# Patient Record
Sex: FEMALE | Race: BLACK OR AFRICAN AMERICAN | Employment: FULL TIME | URBAN - METROPOLITAN AREA
[De-identification: names, ages, dates, MRNs, and addresses within clinical notes are randomized per-mention and may not be internally consistent; named-entity substitution may affect disease eponyms.]

---

## 2019-08-06 ENCOUNTER — HOSPITAL ENCOUNTER (OUTPATIENT)
Dept: LAB | Age: 49
Discharge: HOME | End: 2019-08-06
Payer: COMMERCIAL

## 2019-08-06 LAB
BASOPHILS # BLD: 0 K/UL (ref 0–0.2)
BASOPHILS NFR BLD: 0 % (ref 0–2)
CRP SERPL-MCNC: <0.3 MG/DL (ref 0–0.9)
DIFFERENTIAL METHOD BLD: NORMAL
EOSINOPHIL # BLD: 0.1 K/UL (ref 0–0.8)
EOSINOPHIL NFR BLD: 2 % (ref 0.5–7.8)
ERYTHROCYTE [DISTWIDTH] IN BLOOD BY AUTOMATED COUNT: 12 % (ref 11.9–14.6)
ERYTHROCYTE [SEDIMENTATION RATE] IN BLOOD: 20 MM/HR (ref 0–30)
HCT VFR BLD AUTO: 38 % (ref 35.8–46.3)
HGB BLD-MCNC: 12.8 G/DL (ref 11.7–15.4)
IMM GRANULOCYTES # BLD AUTO: 0 K/UL (ref 0–0.5)
IMM GRANULOCYTES NFR BLD AUTO: 0 % (ref 0–5)
LYMPHOCYTES # BLD: 2.1 K/UL (ref 0.5–4.6)
LYMPHOCYTES NFR BLD: 31 % (ref 13–44)
MCH RBC QN AUTO: 31.1 PG (ref 26.1–32.9)
MCHC RBC AUTO-ENTMCNC: 33.7 G/DL (ref 31.4–35)
MCV RBC AUTO: 92.5 FL (ref 79.6–97.8)
MONOCYTES # BLD: 0.5 K/UL (ref 0.1–1.3)
MONOCYTES NFR BLD: 8 % (ref 4–12)
NEUTS SEG # BLD: 4 K/UL (ref 1.7–8.2)
NEUTS SEG NFR BLD: 59 % (ref 43–78)
NRBC # BLD: 0 K/UL (ref 0–0.2)
PLATELET # BLD AUTO: 193 K/UL (ref 150–450)
PMV BLD AUTO: 11.8 FL (ref 9.4–12.3)
RBC # BLD AUTO: 4.11 M/UL (ref 4.05–5.2)
URATE SERPL-MCNC: 5.3 MG/DL (ref 2.6–6)
WBC # BLD AUTO: 6.9 K/UL (ref 4.3–11.1)

## 2019-08-06 PROCEDURE — 81374 HLA I TYPING 1 ANTIGEN LR: CPT

## 2019-08-06 PROCEDURE — 84550 ASSAY OF BLOOD/URIC ACID: CPT

## 2019-08-06 PROCEDURE — 86200 CCP ANTIBODY: CPT

## 2019-08-06 PROCEDURE — 86140 C-REACTIVE PROTEIN: CPT

## 2019-08-06 PROCEDURE — 86038 ANTINUCLEAR ANTIBODIES: CPT

## 2019-08-06 PROCEDURE — 86431 RHEUMATOID FACTOR QUANT: CPT

## 2019-08-06 PROCEDURE — 85025 COMPLETE CBC W/AUTO DIFF WBC: CPT

## 2019-08-06 PROCEDURE — 85652 RBC SED RATE AUTOMATED: CPT

## 2019-08-06 PROCEDURE — 86430 RHEUMATOID FACTOR TEST QUAL: CPT

## 2019-08-06 PROCEDURE — 36415 COLL VENOUS BLD VENIPUNCTURE: CPT

## 2019-08-07 LAB
RHEUMATOID FACT SER QL LA: POSITIVE
RHEUMATOID FACT TITR SER LA: NORMAL {TITER}

## 2019-08-08 LAB
ANA SER QL: NEGATIVE
CCP IGA+IGG SERPL IA-ACNC: 7 UNITS (ref 0–19)

## 2019-08-12 LAB — HLA-B27 QL NAA+PROBE: NEGATIVE

## 2019-09-19 RX ORDER — SODIUM CHLORIDE 0.9 % (FLUSH) 0.9 %
5-40 SYRINGE (ML) INJECTION EVERY 8 HOURS
Status: CANCELLED | OUTPATIENT
Start: 2019-09-19

## 2019-09-19 RX ORDER — SODIUM CHLORIDE 0.9 % (FLUSH) 0.9 %
5-40 SYRINGE (ML) INJECTION AS NEEDED
Status: CANCELLED | OUTPATIENT
Start: 2019-09-19

## 2019-09-22 ENCOUNTER — ANESTHESIA EVENT (OUTPATIENT)
Dept: SURGERY | Age: 49
End: 2019-09-22
Payer: COMMERCIAL

## 2019-09-22 NOTE — DISCHARGE INSTRUCTIONS
Postoperative  Instructions:      Weightbearing or Lifting:  Limit  weight  lifting  to  less  than  2  pounds  (coffee  mug)  for  the  first  2  weeks  after  surgery. Dressing  instructions:    Keep  your  dressing  and/or  splint  clean  and  dry  at  all  times. You  can  remove  your  dressing  on  post-operative  day  #5  and  change  with  a  dry/sterile  dressing  or  Band-Aids  as  needed  thereafter. Showering  Instructions:  May  shower  But keep surgical dressing clean and dry until removed as explained above. After dressing is removed, you may allows soapy water to run through the incision during showers but do not scrub. After each shower, pat dry and apply a dry dressing. Do  not  soak  your  Incision in still water or bathtub  for  3  weeks  after  surgery. If  the  incision  gets  wet otherwise,  pat  dry  and  do  not  scrub  the  incision. Do  not  apply  cream  or  lotion  to  incision      Pain  Control:  - You  have  been  given  a  prescription  to  be  taken  as  directed  for  post-operative  pain  control. In  addition,  elevate  the  operative  extremity  above  the  heart  at  all  times  to  prevent  swelling  and  throbbing  pain. - If you develop constipation while taking narcotic pain medications (Norco, Hydrocodone, Percocet, Oxycodone, Dilaudid, Hydromorphone) take  over-the-counter  Colace,  100mg  by  mouth  twice  a  Day. - Nausea  is  a  common  side  effect  of  many  pain  medications. You  will  want  to  eat something  before  taking  your  pain  medicine  to  help  prevent  Nausea. - If  you  are  taking  a  prescription  pain  medication  that  contains  acetaminophen,  we  recommend  that  you  do  not  take  additional  over  the  counter  acetaminophen  (Tylenol®).       Other  pain  relieving  options:   - Using  a  cold  pack  to  ice  the  affected  area  a  few  times  a  day  (15  to  20  minutes  at  a  time)  can  help  to  relieve pain,  reduce  swelling  and  bruising.      - Elevation  of  the  affected  area  can  also  help  to  reduce  pain  and  swelling. Did  you  receive  a  nerve  Block? A  nerve  block  can  provide  pain  relief  for  one  hour  to  two  days  after  your  surgery. As  long  as  the  nerve  block  is  working,  you  will  experience  little  or  no  sensation  in  the  area  the  surgeon  operated  on. As  the  nerve  block  wears  off,  you  will  begin  to  experience  pain  or  discomfort. It  is  very  important  that  you  begin  taking  your  prescribed  pain  medication  before  the  nerve  block  fully  wears  off. The first sign that the nerve block is wearing off is tingling in your fingers. Treating  your  pain  at  the  first  sign  of  the  block  wearing  off  will  ensure  your  pain  is  better  controlled  and  more  tolerable  when  full-sensation  returns. Do  not  wait  until  the  pain  is  intolerable,  as  the  medicine  will  be  less  effective. It  is  better  to  treat  pain  in  advance  than  to  try  and  catch  up. General  Anesthesia or Sedation:      If  you  did  not  receive  a  nerve  block  during  your  surgery,  you  will  need  to  start  taking  your  pain  medication  shortly  after  your  surgery  and  should  continue  to  do  so  as  prescribed  by  your  surgeon. Please  call  [phone]  with any concern and ask to speak with Antonio Baez. Concerning problems include:      -  Excessive  redness  of  the  incisions      -  Drainage  for  more  than  2  Days after surgery or any foul smelling drainage  -  Fever  of  more  than  101.5  F      Please  call  [phone]  if  you  do  not  receive  or  are  unsure  of  your  first  follow-up  appointment. You  should  see  the  doctor  10-14  days  after  your  Surgery. Thank you for choosing me and [address] [place] [address] for your care.  I will go above and beyond to ensure you receive the best care possible.     Tommy Hidalgo MD, PhD

## 2019-09-22 NOTE — H&P
Darryle Sear  1970  female    Follow up:  right tennis elbow, right carpal tunnel syndrome    HPI: Patient is a 44year old female who is following up for right tennis elbow, right carpal tunnel syndrome. Last visit we provided referral to rheumatology because we got a positive RA lab. She was seen by rheumatology and started on Mobic because her titers were low, they will recheck the titers in 2 months and discuss further management if titers are higher. The current symptoms are no better on the elbow. ?????. Past Medical and Surgical, Family and Social History: This has been reviewed and documented on the patient intake form. See scanned documents for further information. Medications: ????? Allergies: NKDA  All medical history, medications and allergies have been discussed with the patient today. ROS:  This has been reviewed and documented on the patient intake form. See scanned documents for further information. Patient reports no change in past medical & surgical history, medications, allergies, social history, family history and review of systems since last visit    Physical Examination: Patient is a healthy appearing female in no acute distress. Bilateral upper limbs have equal and intact peripheral pulses. Skin does not demonstrate any rashes or lesions. Pain at lateral epicondyle, worse with resisted wrist extension and with pronated liftoff test, no pain at medial epicondyle, negative tinel at the Cubital tunnel. Positive Tinel and phalen at the right wrist for CTS, normal sensation in ulnar and median distribution but she does endorse median distrubution numbness intermittently. Imaging:  MRI reviewed and demonstrates high grade partial tear of the common extensor mass, radiocapitellar plica and some tendinosis of the common flexor mass  ?   Assessment: G56.01 Right carpal tunnel syndrome   M77.11 Lateral epicondylitis, right elbow     Plan: We discussed the diagnosis and different treatment options. We discussed repeated steroid injections and give it more time, patient is frustrated and she wants to have surgery because this has been going on for over 8 months and she has tried steroid injections and therapy. After thorough discussion, the patient elects to proceed with RIGHT LATERAL EPICONDYLE DEBRIDEMENT, EXTENSOR TENDON REPAIR AND RADIOCAPITELLAR JOINT DEBRIDEMENT. Patient voiced accordance and understanding of the information provided and the formulated plan. All questions were answered to the patient's satisfaction during the encounter. ????? Patient understands risks and benefits of RIGHT LATERAL EPICONDYLE DEBRIDEMENT, EXTENSOR TENDON REPAIR AND RADIOCAPITELLAR JOINT DEBRIDEMENT including but not limited to nerve injury, vessel injury, infection, failure to achieve desired results and possible need for additional surgery. Patient understands and wishes to proceed with surgery.     On Exam:   The patient is alert and oriented; ;   Lung auscultation is clear bilaterally   Heart has RRR without murmurs    Jie Drew MD  09/22/19  7:16 AM

## 2019-09-23 ENCOUNTER — ANESTHESIA (OUTPATIENT)
Dept: SURGERY | Age: 49
End: 2019-09-23
Payer: COMMERCIAL

## 2019-09-23 ENCOUNTER — HOSPITAL ENCOUNTER (OUTPATIENT)
Age: 49
Setting detail: OUTPATIENT SURGERY
Discharge: HOME | End: 2019-09-23
Attending: ORTHOPAEDIC SURGERY | Admitting: ORTHOPAEDIC SURGERY
Payer: COMMERCIAL

## 2019-09-23 VITALS
TEMPERATURE: 98 F | DIASTOLIC BLOOD PRESSURE: 99 MMHG | HEART RATE: 96 BPM | BODY MASS INDEX: 35.22 KG/M2 | SYSTOLIC BLOOD PRESSURE: 175 MMHG | WEIGHT: 202 LBS | OXYGEN SATURATION: 98 % | RESPIRATION RATE: 18 BRPM

## 2019-09-23 LAB — POTASSIUM BLD-SCNC: 3.4 MMOL/L (ref 3.5–5.1)

## 2019-09-23 PROCEDURE — 77030040356 HC CORD BPLR FRCP COVD -A: Performed by: ORTHOPAEDIC SURGERY

## 2019-09-23 PROCEDURE — 77030031139 HC SUT VCRL2 J&J -A: Performed by: ORTHOPAEDIC SURGERY

## 2019-09-23 PROCEDURE — C1713 ANCHOR/SCREW BN/BN,TIS/BN: HCPCS | Performed by: ORTHOPAEDIC SURGERY

## 2019-09-23 PROCEDURE — 74011250636 HC RX REV CODE- 250/636

## 2019-09-23 PROCEDURE — 77030018836 HC SOL IRR NACL ICUM -A: Performed by: ORTHOPAEDIC SURGERY

## 2019-09-23 PROCEDURE — 77030003602 HC NDL NRV BLK BBMI -B: Performed by: ANESTHESIOLOGY

## 2019-09-23 PROCEDURE — 74011250636 HC RX REV CODE- 250/636: Performed by: ORTHOPAEDIC SURGERY

## 2019-09-23 PROCEDURE — 77030040116 HC KIT SURG BIT GD Q-FX DISP S&N -C: Performed by: ORTHOPAEDIC SURGERY

## 2019-09-23 PROCEDURE — 77030006586 HC BLD ARTHSC BVR BD -A: Performed by: ORTHOPAEDIC SURGERY

## 2019-09-23 PROCEDURE — 77030000032 HC CUF TRNQT ZIMM -B: Performed by: ORTHOPAEDIC SURGERY

## 2019-09-23 PROCEDURE — 76210000020 HC REC RM PH II FIRST 0.5 HR: Performed by: ORTHOPAEDIC SURGERY

## 2019-09-23 PROCEDURE — 77030002933 HC SUT MCRYL J&J -A: Performed by: ORTHOPAEDIC SURGERY

## 2019-09-23 PROCEDURE — 77030012711 HC WND ARTHRO ABLT S&N -D: Performed by: ORTHOPAEDIC SURGERY

## 2019-09-23 PROCEDURE — 76942 ECHO GUIDE FOR BIOPSY: CPT | Performed by: ORTHOPAEDIC SURGERY

## 2019-09-23 PROCEDURE — 74011000250 HC RX REV CODE- 250

## 2019-09-23 PROCEDURE — 77030039266 HC ADH SKN EXOFIN S2SG -A: Performed by: ORTHOPAEDIC SURGERY

## 2019-09-23 PROCEDURE — 76060000033 HC ANESTHESIA 1 TO 1.5 HR: Performed by: ORTHOPAEDIC SURGERY

## 2019-09-23 PROCEDURE — 76010000161 HC OR TIME 1 TO 1.5 HR INTENSV-TIER 1: Performed by: ORTHOPAEDIC SURGERY

## 2019-09-23 PROCEDURE — 84132 ASSAY OF SERUM POTASSIUM: CPT

## 2019-09-23 PROCEDURE — 77030003690 HC NDL TAPR ASPN -A: Performed by: ORTHOPAEDIC SURGERY

## 2019-09-23 PROCEDURE — 74011250636 HC RX REV CODE- 250/636: Performed by: ANESTHESIOLOGY

## 2019-09-23 PROCEDURE — 76010010054 HC POST OP PAIN BLOCK: Performed by: ORTHOPAEDIC SURGERY

## 2019-09-23 PROCEDURE — 76210000063 HC OR PH I REC FIRST 0.5 HR: Performed by: ORTHOPAEDIC SURGERY

## 2019-09-23 DEVICE — 1.8MM Q-FIX ALL SUTURE ANCHOR
Type: IMPLANTABLE DEVICE | Site: ARM | Status: FUNCTIONAL
Brand: Q-FIX

## 2019-09-23 RX ORDER — LIDOCAINE HYDROCHLORIDE 20 MG/ML
INJECTION, SOLUTION EPIDURAL; INFILTRATION; INTRACAUDAL; PERINEURAL AS NEEDED
Status: DISCONTINUED | OUTPATIENT
Start: 2019-09-23 | End: 2019-09-23 | Stop reason: HOSPADM

## 2019-09-23 RX ORDER — SODIUM CHLORIDE 0.9 % (FLUSH) 0.9 %
5-40 SYRINGE (ML) INJECTION EVERY 8 HOURS
Status: DISCONTINUED | OUTPATIENT
Start: 2019-09-23 | End: 2019-09-23 | Stop reason: HOSPADM

## 2019-09-23 RX ORDER — LABETALOL HYDROCHLORIDE 5 MG/ML
10 INJECTION, SOLUTION INTRAVENOUS ONCE
Status: DISCONTINUED | OUTPATIENT
Start: 2019-09-23 | End: 2019-09-23 | Stop reason: HOSPADM

## 2019-09-23 RX ORDER — OXYCODONE AND ACETAMINOPHEN 5; 325 MG/1; MG/1
1 TABLET ORAL AS NEEDED
Status: DISCONTINUED | OUTPATIENT
Start: 2019-09-23 | End: 2019-09-23 | Stop reason: HOSPADM

## 2019-09-23 RX ORDER — SODIUM CHLORIDE 0.9 % (FLUSH) 0.9 %
5-40 SYRINGE (ML) INJECTION AS NEEDED
Status: DISCONTINUED | OUTPATIENT
Start: 2019-09-23 | End: 2019-09-23 | Stop reason: HOSPADM

## 2019-09-23 RX ORDER — PROPOFOL 10 MG/ML
INJECTION, EMULSION INTRAVENOUS AS NEEDED
Status: DISCONTINUED | OUTPATIENT
Start: 2019-09-23 | End: 2019-09-23 | Stop reason: HOSPADM

## 2019-09-23 RX ORDER — BUPIVACAINE HYDROCHLORIDE 2.5 MG/ML
INJECTION, SOLUTION EPIDURAL; INFILTRATION; INTRACAUDAL
Status: COMPLETED | OUTPATIENT
Start: 2019-09-23 | End: 2019-09-23

## 2019-09-23 RX ORDER — MIDAZOLAM HYDROCHLORIDE 1 MG/ML
2 INJECTION, SOLUTION INTRAMUSCULAR; INTRAVENOUS
Status: COMPLETED | OUTPATIENT
Start: 2019-09-23 | End: 2019-09-23

## 2019-09-23 RX ORDER — HYDROMORPHONE HYDROCHLORIDE 2 MG/ML
0.5 INJECTION, SOLUTION INTRAMUSCULAR; INTRAVENOUS; SUBCUTANEOUS
Status: DISCONTINUED | OUTPATIENT
Start: 2019-09-23 | End: 2019-09-23 | Stop reason: HOSPADM

## 2019-09-23 RX ORDER — SODIUM CHLORIDE, SODIUM LACTATE, POTASSIUM CHLORIDE, CALCIUM CHLORIDE 600; 310; 30; 20 MG/100ML; MG/100ML; MG/100ML; MG/100ML
75 INJECTION, SOLUTION INTRAVENOUS CONTINUOUS
Status: DISCONTINUED | OUTPATIENT
Start: 2019-09-23 | End: 2019-09-23 | Stop reason: HOSPADM

## 2019-09-23 RX ORDER — CEFAZOLIN SODIUM/WATER 2 G/20 ML
2 SYRINGE (ML) INTRAVENOUS ONCE
Status: COMPLETED | OUTPATIENT
Start: 2019-09-23 | End: 2019-09-23

## 2019-09-23 RX ORDER — LABETALOL HYDROCHLORIDE 5 MG/ML
INJECTION, SOLUTION INTRAVENOUS
Status: COMPLETED
Start: 2019-09-23 | End: 2019-09-23

## 2019-09-23 RX ORDER — LIDOCAINE HYDROCHLORIDE 10 MG/ML
0.1 INJECTION INFILTRATION; PERINEURAL AS NEEDED
Status: DISCONTINUED | OUTPATIENT
Start: 2019-09-23 | End: 2019-09-23 | Stop reason: HOSPADM

## 2019-09-23 RX ORDER — PROPOFOL 10 MG/ML
INJECTION, EMULSION INTRAVENOUS
Status: DISCONTINUED | OUTPATIENT
Start: 2019-09-23 | End: 2019-09-23 | Stop reason: HOSPADM

## 2019-09-23 RX ORDER — NALOXONE HYDROCHLORIDE 0.4 MG/ML
0.2 INJECTION, SOLUTION INTRAMUSCULAR; INTRAVENOUS; SUBCUTANEOUS AS NEEDED
Status: DISCONTINUED | OUTPATIENT
Start: 2019-09-23 | End: 2019-09-23 | Stop reason: HOSPADM

## 2019-09-23 RX ORDER — BUPIVACAINE HYDROCHLORIDE AND EPINEPHRINE 5; 5 MG/ML; UG/ML
INJECTION, SOLUTION EPIDURAL; INTRACAUDAL; PERINEURAL
Status: COMPLETED | OUTPATIENT
Start: 2019-09-23 | End: 2019-09-23

## 2019-09-23 RX ADMIN — LABETALOL HYDROCHLORIDE: 5 INJECTION INTRAVENOUS at 17:30

## 2019-09-23 RX ADMIN — LIDOCAINE HYDROCHLORIDE 40 MG: 20 INJECTION, SOLUTION EPIDURAL; INFILTRATION; INTRACAUDAL; PERINEURAL at 15:44

## 2019-09-23 RX ADMIN — BUPIVACAINE HYDROCHLORIDE 10 ML: 2.5 INJECTION, SOLUTION EPIDURAL; INFILTRATION; INTRACAUDAL at 13:59

## 2019-09-23 RX ADMIN — BUPIVACAINE HYDROCHLORIDE AND EPINEPHRINE 10 ML: 5; 5 INJECTION, SOLUTION EPIDURAL; INTRACAUDAL; PERINEURAL at 13:59

## 2019-09-23 RX ADMIN — MIDAZOLAM HYDROCHLORIDE 2 MG: 2 INJECTION, SOLUTION INTRAMUSCULAR; INTRAVENOUS at 13:54

## 2019-09-23 RX ADMIN — PROPOFOL 30 MG: 10 INJECTION, EMULSION INTRAVENOUS at 15:44

## 2019-09-23 RX ADMIN — SODIUM CHLORIDE, SODIUM LACTATE, POTASSIUM CHLORIDE, AND CALCIUM CHLORIDE: 600; 310; 30; 20 INJECTION, SOLUTION INTRAVENOUS at 16:45

## 2019-09-23 RX ADMIN — SODIUM CHLORIDE, SODIUM LACTATE, POTASSIUM CHLORIDE, AND CALCIUM CHLORIDE 75 ML/HR: 600; 310; 30; 20 INJECTION, SOLUTION INTRAVENOUS at 12:17

## 2019-09-23 RX ADMIN — Medication 2 G: at 15:41

## 2019-09-23 RX ADMIN — PROPOFOL 140 MCG/KG/MIN: 10 INJECTION, EMULSION INTRAVENOUS at 15:44

## 2019-09-23 NOTE — ANESTHESIA PROCEDURE NOTES
Right Supraclavicular Block    Start time: 9/23/2019 1:55 PM  End time: 9/23/2019 1:59 PM  Performed by: Tamara Garcia MD  Authorized by: Tamara Garcia MD       Pre-procedure: Indications: at surgeon's request and post-op pain management    Preanesthetic Checklist: patient identified, risks and benefits discussed, site marked, timeout performed, anesthesia consent given and patient being monitored    Timeout Time: 13:55          Block Type:   Block Type:  Supraclavicular  Laterality:  Right  Monitoring:  Standard ASA monitoring, responsive to questions, oxygen, continuous pulse ox, frequent vital sign checks and heart rate  Injection Technique:  Single shot  Procedures: ultrasound guided    Patient Position: seated  Prep: chlorhexidine    Location:  Supraclavicular  Needle Type:  Stimuplex  Needle Gauge:  22 G  Needle Localization:  Ultrasound guidance  Motor Response: minimal motor response >0.4 mA      Assessment:  Number of attempts:  1  Injection Assessment:  Incremental injection every 5 mL, negative aspiration for CSF, no paresthesia, ultrasound image on chart, no intravascular symptoms, negative aspiration for blood and local visualized surrounding nerve on ultrasound  Patient tolerance:  Patient tolerated the procedure well with no immediate complications  The relevant block area was scanned before, during, and after the local anesthetic injection and no gross abnormalities were observed.

## 2019-09-23 NOTE — ANESTHESIA PREPROCEDURE EVALUATION
Relevant Problems   No relevant active problems       Anesthetic History   No history of anesthetic complications            Review of Systems / Medical History  Patient summary reviewed, nursing notes reviewed and pertinent labs reviewed    Pulmonary  Within defined limits                 Neuro/Psych         Headaches     Cardiovascular    Hypertension: well controlled          Hyperlipidemia    Exercise tolerance: >4 METS     GI/Hepatic/Renal                Endo/Other    Diabetes: type 2         Other Findings   Comments: On Januvia and Vyctoza           Physical Exam    Airway  Mallampati: II  TM Distance: 4 - 6 cm  Neck ROM: normal range of motion   Mouth opening: Normal     Cardiovascular    Rhythm: regular           Dental  No notable dental hx       Pulmonary  Breath sounds clear to auscultation               Abdominal  GI exam deferred       Other Findings            Anesthetic Plan    ASA: 3  Anesthesia type: total IV anesthesia      Post-op pain plan if not by surgeon: peripheral nerve block single      Anesthetic plan and risks discussed with: Patient and Spouse

## 2019-09-23 NOTE — ANESTHESIA POSTPROCEDURE EVALUATION
Procedure(s):  RIGHT HAND CARPAL TUNNEL RELEASE  RIGHT LATERAL EPICONDYLE DEBRIDEMENT AND TENDON REPAIR.    total IV anesthesia    Anesthesia Post Evaluation      Multimodal analgesia: multimodal analgesia used between 6 hours prior to anesthesia start to PACU discharge  Patient location during evaluation: PACU  Patient participation: complete - patient participated  Level of consciousness: awake and alert  Pain management: adequate  Airway patency: patent  Anesthetic complications: no  Cardiovascular status: acceptable  Respiratory status: acceptable  Hydration status: acceptable  Post anesthesia nausea and vomiting:  none      Vitals Value Taken Time   /110 9/23/2019  5:30 PM   Temp 36.6 °C (97.9 °F) 9/23/2019  4:55 PM   Pulse 97 9/23/2019  5:30 PM   Resp 17 9/23/2019  5:30 PM   SpO2 93 % 9/23/2019  5:30 PM

## 2019-09-23 NOTE — PERIOP NOTES
Instructions to  . Arm dressing is dry and intact. No distress. bp is 175/99 upon discharge, ok with dr. Es Arguelles.

## 2019-09-23 NOTE — H&P
H&P Update:  Juhi Milesnne Lung was seen and examined. History and physical has been reviewed. The patient has been examined.  There have been no significant clinical changes since the completion of the originally dated History and Romina Turner MD, PhD  Orthopaedic Surgery  09/23/19  3:11 PM

## 2019-09-24 NOTE — OP NOTES
ORTHOPAEDIC SURGICAL NOTE        [address] [facility] female 44 y.o.  [id number]   9/23/2019     PRE-OP DIAGNOSIS: Carpal tunnel syndrome on right [G56.01]  Lateral epicondylitis of right elbow [M77.11]   POST-OP DIAGNOSIS: Carpal tunnel syndrome on right [G56.01]  Lateral epicondylitis of right elbow [M77.11]   LATERALITY: Right  Right     PROCEDURES PERFORMED:   Right carpal tunnel release, right lateral epicondyle debridement with extensor tendon reattachment and radiofrequency debridement, right elbow arthrotomy, synovectomy and plica excision (51730, 51802, 30349)       SURGEON:   Bull Graf MD, PhD     IMPLANTS:   Implant Name Type Inv. Item Serial No.  Lot No. LRB No. Used Action   ANCHOR SUT Q-FIX ALL 1.8MM -- Q-FIX - [id number]  ANCHOR SUT Q-FIX ALL 1.8MM -- Q-FIX  [employer] [id number] Right 1 Implanted      Procedure(s):  RIGHT HAND CARPAL TUNNEL RELEASE  RIGHT LATERAL EPICONDYLE DEBRIDEMENT AND TENDON REPAIR   Surgeon(s):  Barbara Carcamo MD   Procedure(s):  RIGHT HAND CARPAL TUNNEL RELEASE  RIGHT LATERAL EPICONDYLE DEBRIDEMENT AND TENDON REPAIR     ANESTHESIA: Regional     STAFF:    Circ-1: Arnie Lombardi RN  Scrub Tech-1: Addy Galvez  Scrub Tech-Relief: Gab Webb     ESTIMATED BLOOD LOSS: Minimal       TOTAL IV FLUIDS : See anesthesia note    COMPLICATIONS: None     DRAINS:       TOURNIQUET TIME:   Total Tourniquet Time Documented:  Upper Arm (Right) - 53 minutes  Total: Upper Arm (Right) - 53 minutes       INDICATION FOR PROCEDURE:     Shannan Mcgill has longstanding right lateral epicondylitis and right carpal tunnel syndrome. Recent MRI demonstrated changes consistent with lateral epicondylitis as well as radiocapitellar plica. Surgical and non-surgical treatment options were discussed with the patient and their family, as well as the risk and benefits of each option.  After thorough discussion, the patient decided to proceed with surgical management. Specific to this treatment plan, we discussed in detail surgical risks including scar, pain, bleeding, infection, anesthetic risks, neurovascular injury, failure to achieve desired results, hardware problems, need for further surgery,  weakness, stiffness, risk of death and potential risk of other unforseen complication. The patient consented to the procedure after discussion of the risks and benefits. DESCRIPTION OF PROCEDURE:     The patient was identified in the holding room. The Right  Right wrist and elbow was marked and confirmed as the correct operative site. They were then brought to the OR and general anesthesia was induced. They were transferred onto the OR table in the supine position. All bony prominences were well padded. SCDs were placed on the bilateral legs throughout the case. A timeout was performed, verifying the correct patient, the correct side being the Right  Right side and the correct procedure. Antibiotics were then administered, and were redosed during the procedure as needed at indicated intervals. A non-sterile tourniquet was placed on the Right  Right arm. The Right  Right upper extremity was pre scrubbed and then prepped and draped in routine sterile fashion. An incisional timeout was performed re-confirming the correct patient, surgical site and procedure, as well as verifying antibiotics. The carpal tunnel incision was made in the palm in line with the radial border of the ring finger under loupe magnification and palmar fascia was incised longitudinally. Blunt retraction used to identify the transverse carpal ligament, which was incised, visualizing the median nerve beneath, which was protected with a slotted freer. The remaining ligament was released with a [place] meniscal blade under direct visualization. The ligament was released in its entirety, and visualized at its most proximal and distal extent.       Wound was irrigated and closed with 5-0 monocryl, glue and sterile dressing applied. A lateral approach was used, the incision was made over the lateral epicondyle and extended distally about 3cms, bovie was used for dissection down to the lateal muscles. The interval between [facility] and ECRL was incised with a knife and the deeper ECRB was identified, there was significant mucous degeneration of the tendon and this was sharply excised, the lateral epicondyle was debrided with sharp knife and the bone was roughed up with a rongeur down to bleeding bed. The radiocapitellar joint was then incised, an arthrotomy was done while staying posterior to the mid radial head and keeping the forearm pronated to avoid injury to the PIN. There was some synovitis which was sharply excised but this was not very significant. There was a prominent radiocapitellar plica which was also sharply excised. The joint was irrigated and the capsule closed with interrupted 0 vicryl sutures. A Smith and Nephew 1.8mm suture anchors was applied to the lateral epicondyle and this was used to repair the tendons back to the epicondyle. The interval was then closed with a runnig locking 0 Vicryl until a tight seal was obtained. The Topaz wand was then used by creating a grid of perforations of 20 in total.     The tourniquet was deflated and hemostasis was ensured. The wounds were then thoroughly irrigated and closed in layered fashion with 3-0 Vicryl and 3-0 monocryl. A sterile dressing was applied followed by a  compressive dressing     The patient was awakened and taken to PACU in stable condition. All sponge and needle counts were correct at the end of the case. I was present and scrubbed for the entire procedure. DISPOSITION:    The patient will be discharged home.      Weightbearing status: NWB DARLINE       CHEMICAL VTE (DVT) PROPHYLAXIS: None warranted for this case     FOLLOW-UP:  10-14 days for suture removal.     Kike Aldridge MD    09/23/19  10:24 PM

## 2019-12-18 PROBLEM — E66.01 SEVERE OBESITY (HCC): Status: ACTIVE | Noted: 2019-12-18

## 2019-12-19 ENCOUNTER — HOSPITAL ENCOUNTER (OUTPATIENT)
Dept: MAMMOGRAPHY | Age: 49
Discharge: HOME | End: 2019-12-19
Attending: INTERNAL MEDICINE
Payer: COMMERCIAL

## 2019-12-19 DIAGNOSIS — Z12.31 SCREENING MAMMOGRAM FOR HIGH-RISK PATIENT: ICD-10-CM

## 2019-12-19 PROCEDURE — 77067 SCR MAMMO BI INCL CAD: CPT

## 2020-07-15 ENCOUNTER — HOSPITAL ENCOUNTER (OUTPATIENT)
Dept: PHYSICAL THERAPY | Age: 50
Discharge: HOME | End: 2020-07-15
Attending: INTERNAL MEDICINE
Payer: COMMERCIAL

## 2020-07-15 DIAGNOSIS — M54.9 MID BACK PAIN: ICD-10-CM

## 2020-07-15 PROCEDURE — 97161 PT EVAL LOW COMPLEX 20 MIN: CPT

## 2020-07-15 PROCEDURE — 97110 THERAPEUTIC EXERCISES: CPT

## 2020-07-15 PROCEDURE — 97140 MANUAL THERAPY 1/> REGIONS: CPT

## 2020-07-15 NOTE — PROGRESS NOTES
Aida Huerta  : 1970  Payor: Lia Rangel / Plan: [id number] [facility] [[email or web address]] / Product Type: PPO /  [address]  [facility], [address]  [phone]   [phone]      OUTPATIENT PHYSICAL THERAPY: Daily Treatment Note 7/15/2020  Visit Count:  1      ICD-10: Treatment Diagnosis:   M54.9 Mid Back Pain  M54.5 Low Back Pain   R26.2 Difficulty in Walking, Not Elsewhere Classified    Precautions/Allergies:   Biaxin [clarithromycin] and Topamax [topiramate]     TREATMENT PLAN:  Effective Dates: 7/15/2020 TO 10/13/2020 (90 days). Frequency/Duration: 2 times a week for 90 Days        PRE-TREATMENT SYMPTOMS/COMPLAINTS:  See eval    MEDICATIONS REVIEWED:  7/15/2020   TREATMENT:   (In addition to Assessment/Re-Assessment sessions the following treatments were rendered)    THERAPEUTIC EXERCISE: (10 minutes):  Exercises per grid below to improve mobility, strength and balance. Required minimal visual and verbal cues to promote proper body alignment and promote proper body posture. Progressed resistance, range and complexity of movement as indicated. Date:  7/15/2020 Date:   Date:     Activity/Exercise Parameters Parameters Parameters   Hooklying TA x15     LTR x30/side     SKTC x15/leg     Supine piriformis stretch [id number]/leg                         MANUAL THERAPY: (20 minutes): Joint mobilization, Soft tissue mobilization and Manipulation was utilized and necessary because of the patient's restricted joint motion, painful spasm, loss of articular motion and restricted motion of soft tissue.   +Sidelying Lumbar Roll mob Grade III/IV to increase mobility Bilat  +Bilat LAD Grade III to improve mobility and distract lumbar spine  +Bilat Piriformis and IR manual stretches       (Used abbreviations: MET - muscle energy technique; PNF - proprioceptive neuromuscular facilitation; NMR - neuromuscular re-education; AP - anterior to posterior; PA - posterior to anterior)    MODALITIES: (0 minutes):      None Today      TREATMENT/SESSION ASSESSMENT:  Lesia Shah verbalized understanding of role of PT and POC. Pt initially very stiff and painful with exercises and mobility. Improved throughout session. Some decrease in subjective carson following. Education: All exercises prescribed as HEP    RECOMMENDATIONS/INTENT FOR NEXT TREATMENT SESSION: \"Next visit will focus on advancements to more challenging activities\".     PAIN: Initial: 5/10 Post Session:  4/10     [employer] Portal    Total Treatment Billable Duration: 30  PT Patient Time In/Time Out  Time In: 2177  Time Out: 3328  Nadine Peacock PT, DPT    Future Appointments   Date Time Provider Dunia Duke   7/22/2020  9:30 AM Marylouise Felty [facility]   7/24/2020  8:45 AM Marylouise Felty [facility]   7/27/2020  8:45 AM Marylouise Felty [facility]   7/29/2020  9:30 AM Marylouise Felty [facility]   9/3/2020  9:20 AM Dwan Phoenix, MD [address] [place] [address]

## 2020-07-15 NOTE — THERAPY EVALUATION
Miriam Graft  : 1970  Payor: Radha Smith / Plan: Latosha Sears OTHER / Product Type: PPO /  [address]  at [facility]  [address], [address] [place] [address]  [phone]   [phone]        OUTPATIENT PHYSICAL THERAPY:Initial Assessment and AM 7/15/2020    ICD-10: Treatment Diagnosis:   M54.9 Mid Back Pain  M54.5 Low Back Pain   R26.2 Difficulty in Walking, Not Elsewhere Classified  Precautions/Allergies:   Biaxin [clarithromycin] and Topamax [topiramate]   Fall Risk Score: 0 (? 5 = High Risk)  MD Orders: Referral to Physical Therapy MEDICAL/REFERRING DIAGNOSIS:  Mid back pain [M54.9]   DATE OF ONSET: ~5 months ago  REFERRING PHYSICIAN: Shelton [address]: Tbd by Patient     ASSESSMENT:  Ms. Coralee Homans has attended 1 physical therapy session including the initial evaluation. Pt presents with Lower thoracic, and lumbar pain. Bilat hip weakness noted and increased gait deviations as listed below. Pt has increased lumbar lordosis and decreased core strength/activation that is causing musculoskeletal imbalances leading to current impairments. Due to current impairment pt is modifying work and home habits. Unable to perform recreational activities. Requires skilled PT. Recommending skilled PT: manual therapeutic techniques (as appropriate), therapeutic exercises and activities, balance interventions, and a comprehensive home exercise program to address the current impairments, as listed above. Miriam Vazquez will benefit from skilled PT (medically necessary) to address above deficits affecting participation in basic ADLs and overall functional tolerance. PROBLEM LIST (Impacting functional limitations):  1. Decreased Strength  2. Decreased ADL/Functional Activities  3. Decreased Transfer Abilities  4. Decreased Ambulation Ability/Technique  5. Decreased Balance  6. Increased Pain  7.  Decreased Flexibility/Joint Mobility  8. Decreased Foley with Home Exercise Program INTERVENTIONS PLANNED:  1. Balance Exercise  2. Bed Mobility  3. Cold  4. Cryotherapy  5. Family Education  6. Gait Training  7. Heat  8. Home Exercise Program (HEP)  9. Manual Therapy  10. Neuromuscular Re-education/Strengthening  11. Range of Motion (ROM)  12. Therapeutic Activites  13. Therapeutic Exercise/Strengthening  14. Transcutaneous Electrical Nerve Stimulation (TENS)  15. Transfer Training   TREATMENT PLAN:  TREATMENT PLAN:  Effective Dates: 7/15/2020 TO 10/13/2020 (90 days). Frequency/Duration: 2 times a week for 90 Days  Short-Term Functional Goals: Time Frame: 3 weeks  1. Pt will be compliant with HEP. 2. Pt will decreased worst pain to 4/10 or less in order to improve ambulation  3. Pt will navigate 12 steps reciprocally with single UE use in order to enter home  4. Pt will decreased CAROLINA to 15 or less in order to return to recreational activities   Discharge Goals: Time Frame: 6 weeks  1. Pt will be independent with HEP. 2. Pt will decreased worst pain to 2/10 or less in order to improve ambulation  3. Pt will navigate 12 steps reciprocally without UE use in order to enter home  4. Pt will decreased CAROLINA to 10 or less in order to return to recreational activities  5. Pt will improve BLE strength to 5/5 or greater in order to improve stair navigation  6. Pt will ambulate without antalgia    Rehabilitation Potential For Stated Goals: Good  Regarding Shannan Pate therapy, I certify that the treatment plan above will be carried out by a therapist or under their direction. Thank you for this referral,  Indu Aldridge, PT, DPT     Referring Physician Signature: Bernadette Palm                    The information in this section was collected on 07/15/20 (except where otherwise noted).   HISTORY:   History of Present Injury/Illness (Reason for Referral):  Pt has been dealing with mid back and low back pain for ~ 5 months. Diagnosed with RA in December. Medications for RA did not help back pain. 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 Pain has been constant. Pt has been attempting to return to walking program, but back catches and hurts. CC/Primary Concern:        Mid and low back pain. Treatment Side: Bilateral (LT>RT)    Hospitalization and time spent in care: None    Past Medical History/Comorbidities:   Ms. Susanne Myrick  has a past medical history of Arthralgia (11/19/2013), Carpal tunnel syndrome (11/19/2013), Functional constipation (11/19/2013), HTN (hypertension) (11/19/2013), Hypercholesterolemia, Migraine headache (11/19/2013), Onychomycosis (11/19/2013), Tachycardia (11/19/2013), and UTI (lower urinary tract infection) (11/19/2013). Ms. Susanne Myrick  has a past surgical history that includes hx breast lumpectomy; ir ablation fibroadenoma; hx carpal tunnel release (Right, 09/2019); and hx breast biopsy. Social History/Living Environment:     Work as nurse, lives with , 12 steps to enter home Shane Howard has trouble and uses rail)     Pain/Symptom Location: Lower throacic and low back    Worst Pain: 10/10  Current Pain: 4/10    Aggravating Factors: Standing, Walking, Stairs Up, Stairs Down and Sit to stand    Alleviating Factors/Positions/Motions: Pain medications     General Health: Good    Diagnostic Imaging: No imaging    Occupation: full time job doing nurse    Unexplained Weight Loss: No      Prior Level of Function/Work/Activity:  No pain and fully functioning with ADLs, no compensations    Patient Goals: Walking 1 mile, return to resistance training    Current Medications:       Current Outpatient Medications:     hydrOXYchloroQUINE (PLAQUENIL) 200 mg tablet, Take 1 Tab by mouth two (2) times daily (after meals). , Disp: 180 Tab, Rfl: 0    Victoza 3-Keyon 0.6 mg/0.1 mL (18 mg/3 mL) pnij, 1.8 mg by SubCUTAneous route daily. , Disp: 4 Adjustable Dose Pre-filled Pen Syringe, Rfl: 3    insulin degludec Davonna Cornea FlexTouch U-100) 100 unit/mL (3 mL) inpn, Inject 60 units sq qhs, Disp: 5 Pen, Rfl: 3    flash glucose sensor (FreeStyle Theresa 14 Day Sensor) kit, 1 unit every 14 days, Disp: 4 Kit, Rfl: 2    atorvastatin (LIPITOR) 10 mg tablet, Take 1 Tab by mouth nightly., Disp: , Rfl:     promethazine (PHENERGAN) 25 mg tablet, Take 1 Tab by mouth every six (6) hours as needed for Nausea., Disp: 30 Tab, Rfl: 2    DULoxetine (CYMBALTA) 60 mg capsule, Take 1 Cap by mouth two (2) times a day., Disp: 60 Cap, Rfl: 3    ciprofloxacin HCl (Cipro) 500 mg tablet, Take 1 Tab by mouth two (2) times a day., Disp: 14 Tab, Rfl: 0    omeprazole (PRILOSEC) 40 mg capsule, Take 1 Cap by mouth daily. , Disp: 90 Cap, Rfl: 3    pregabalin (LYRICA) 150 mg capsule, Take 1 Cap by mouth two (2) times a day., Disp: 60 Cap, Rfl: 5    ARIPiprazole (ABILIFY) 10 mg tablet, Take 1 Tab by mouth daily. , Disp: 90 Tab, Rfl: 1    metoprolol tartrate (LOPRESSOR) 100 mg IR tablet, Take 1 Tab by mouth two (2) times a day., Disp: 180 Tab, Rfl: 3    flash glucose scanning reader (FREESTYLE THERESA 14 DAY READER) misc, 1 Units by Does Not Apply route every fourteen (14) days. , Disp: 1 Each, Rfl: 1    amitriptyline (ELAVIL) 100 mg tablet, Take 1 Tab by mouth nightly., Disp: 90 Tab, Rfl: 3    losartan-hydroCHLOROthiazide (HYZAAR) 100-25 mg per tablet, Take 1 Tab by mouth daily. , Disp: 90 Tab, Rfl: 3    dilTIAZem (CARDIZEM CD) 360 mg ER capsule, Take 1 Cap by mouth daily. , Disp: 90 Cap, Rfl: 3    ondansetron hcl (ZOFRAN) 8 mg tablet, Take 1 Tab by mouth every eight (8) hours as needed for Nausea., Disp: 60 Tab, Rfl: 5    docusate sodium (COLACE) 100 mg capsule, Take 100 mg by mouth two (2) times a day., Disp: , Rfl:     Cetirizine (ZYRTEC) 10 mg cap, Take 10 mg by mouth daily. , Disp: , Rfl:     aspirin delayed-release 81 mg tablet, Take 81 mg by mouth daily. , Disp: , Rfl:    Date Last Reviewed: 7/15/2020       Ambulatory/Rehab Services H2 Model Falls Risk Assessment    Risk Factors:       No Risk Factors Identified Ability to Rise from Chair:       (0)  Ability to rise in a single movement    Falls Prevention Plan:       No modifications necessary   Total: (5 or greater = High Risk): 0    ©2010 [facility] [employer]. All Rights Reserved. Palak Najera Patent #[id number]. Federal Law prohibits the replication, distribution or use without written permission from [facility] [employer]        Number of Personal Factors/Comorbidities that affect the Plan of Care: 1-2: MODERATE COMPLEXITY   EXAMINATION:   Inspection        Patient Consent: Yes        Iliac Crest: Right Elevated      PSIS: Right Elevated      ASIS: Right Elevated        Additional Comments:   ________________________________________________________________________________________________  Observation        Transfers: Sit to stand: Increased time, use of BUE and Supine to sit: Increased time        Posture:  Increased lumbar lordosis, BLE in ER (RT>LT)        Gait: Antalgic, Lacks Full Knee Ext at [place]ado & Minor, Decreased Gait Speed, Decreased Stride Length and Decreased Step Length        Assistive Device:        Type: None        Hand Use: N/A        Stairs: Step to Pattern         Railing: right            Muscle Guarding: Lumbar paraspinals      Muscle Atrophy: None appreciated        Edema: No Edema, Erythema or Ecchymosis noted          Pitting: No        Movement Apprehension: Lumbar flexion, extension and rotation    ________________________________________________________________________________________________  Range of Motion        Lumbar  Joint:      Right (Degrees/Percent) Left (Degrees/Percent)   Flexion Hands to reach shins    Extension 10deg    Rotation 40% 50%         Lower  Joint: Passive Passive    Right (Degrees) Left (Degrees)   Hip Flexion 90 90   Hip Internal Rotation 12  14   Hip External Rotation WNL WNL ________________________________________________________________________________________________  Strength          Lower Extremity  Joint:      RIGHT LEFT   Hip Flexion 5/5 5/5   Hip Extension 4/5 4/5   Hip Internal Rotation 4+/5 4/5   Hip External Rotation 5/5 5/5   Hip Abduction 4/5 4/5     ________________________________________________________________________________________________  Neruo-Vascular        C/O Radicular Symptoms: No           LE Neurologic Screen  LE NEUROLOGICAL SCREEN: All dermatomes, myotomes, and reflexes WNL           Additional Comments:   ________________________________________________________________________________________________  Special Tests        Lumbar:        Slump: neg, SLR: neg, Passive Vertebral Mobility: Hypomobile grossly in lumbar spine, Branden: neg and Centralization/Peripheralization: neg        Hip:        Long Axis Distraction: Improves symtptoms          ________________________________________________________________________________________________  Palpation: Tenderness in bilat glutes and bilat piriformis. Increased tone in lumbar paraspinals       Body Structures Involved:  1. Joints  2. Muscles Body Functions Affected:  1. Movement Related Activities and Participation Affected:  1. Mobility  2. Self Care  3. Domestic Life   Number of elements (examined above) that affect the Plan of Care: 4+: HIGH COMPLEXITY   CLINICAL PRESENTATION:   Presentation: Stable and uncomplicated: LOW COMPLEXITY   CLINICAL DECISION MAKING:   Outcome Measure: Tool Used: Modified Oswestry Low Back Pain Questionnaire  Score:  Initial: 26/50  Most Recent: X/50 (Date: -- )   Interpretation of Score: Each section is scored on a 0-5 scale, 5 representing the greatest disability. The scores of each section are added together for a total score of 50.         Medical Necessity:   · Patient is expected to demonstrate progress in strength, range of motion, balance, coordination and functional technique to increase independence with fuctional mobility, wrork activties and recreational activities. Reason for Services/Other Comments:  · Patient continues to require skilled intervention due to increased pain, weakess and decresaed ROM affecting home life, work life, and mobility.    Use of outcome tool(s) and clinical judgement create a POC that gives a: Clear prediction of patient's progress: LOW COMPLEXITY

## 2020-07-22 ENCOUNTER — HOSPITAL ENCOUNTER (OUTPATIENT)
Dept: PHYSICAL THERAPY | Age: 50
Discharge: HOME | End: 2020-07-22
Attending: INTERNAL MEDICINE
Payer: COMMERCIAL

## 2020-07-22 PROCEDURE — 97140 MANUAL THERAPY 1/> REGIONS: CPT

## 2020-07-22 PROCEDURE — 97110 THERAPEUTIC EXERCISES: CPT

## 2020-07-22 NOTE — PROGRESS NOTES
Shemar Salcedo  : 1970  Payor: Kristen Mage / Plan: Lina Corrales / Product Type: PPO /  [address]  at [facility]  [address] [facility], [address]  [phone]   [phone]      OUTPATIENT PHYSICAL THERAPY: Daily Treatment Note 2020  Visit Count:  2      ICD-10: Treatment Diagnosis:   M54.9 Mid Back Pain  M54.5 Low Back Pain   R26.2 Difficulty in Walking, Not Elsewhere Classified    Precautions/Allergies:   Biaxin [clarithromycin] and Topamax [topiramate]     TREATMENT PLAN:  Effective Dates: 7/15/2020 TO 10/13/2020 (90 days). Frequency/Duration: 2 times a week for 90 Days        PRE-TREATMENT SYMPTOMS/COMPLAINTS:  \"I've been doing the exercises and I feel like they're helping. \" 3/10 pain today. No longer having to hold wall when walking. Biggest concern is stiffness. MEDICATIONS REVIEWED:  2020   TREATMENT:       THERAPEUTIC EXERCISE: (30 minutes):  Exercises per grid below to improve mobility, strength and balance. Required minimal visual and verbal cues to promote proper body alignment and promote proper body posture. Progressed resistance, range and complexity of movement as indicated.      Date:  7/15/2020 Date:   Date:     Activity/Exercise Parameters Parameters Parameters   Hooklying TA x15     LTR x30/side     SKTC x15/leg     Supine piriformis stretch [id number]/leg     QL stretch  5x20 sec bilat    Sci-Fit  8 min level 3    Cross body stretch holding Pole  [id number]/side    Seated thoracic Ext  With 2# bar x20 OH with 5 sec hold    Seated trunk twist   2# bar x10 with 5 sec hold     Lumbar Flexion over physioball   Green physioball 5x10 sec hold    TA with March  Standing x10/leg      MANUAL THERAPY: (15 minutes): Joint mobilization, Soft tissue mobilization and Manipulation was utilized and necessary because of the patient's restricted joint motion, painful spasm, loss of articular motion and restricted motion of soft tissue. +Sidelying Lumbar Roll mob Grade III/IV to increase mobility Bilat  +Bilat LAD Grade III to improve mobility and distract lumbar spine  +Sidelying lumbar manipulation        (Used abbreviations: MET - muscle energy technique; PNF - proprioceptive neuromuscular facilitation; NMR - neuromuscular re-education; AP - anterior to posterior; PA - posterior to anterior)    MODALITIES: (0 minutes):      None Today      TREATMENT/SESSION ASSESSMENT:  Lesia Nab is improving with subjective pain gradually. Gross tightness in paraspinals (RT>LT). Some decreased tightness palpated following manual therapy. Pt introduced to more mobility exercises following manual therapy and subjective pain decreased. Moderate cues throughout session to maintain stretch. Good progress towards goals. Education: All exercises prescribed as HEP    RECOMMENDATIONS/INTENT FOR NEXT TREATMENT SESSION: \"Next visit will focus on advancements to more challenging activities\".     PAIN: Initial: 3/10 Post Session:  2/10     [facility] Portal    Total Treatment Billable Duration: 45  PT Patient Time In/Time Out  Time In: 7806  Time Out: [address], PT, DPT    Future Appointments   Date Time Provider Dunia Duke   7/29/2020  9:30 AM Marylouise Felty [facility]   7/31/2020 10:15 AM Marylouise Felty [facility]   9/3/2020  9:20 AM Dwan Phoenix, MD [address] [place] [address]

## 2020-07-24 ENCOUNTER — APPOINTMENT (OUTPATIENT)
Dept: PHYSICAL THERAPY | Age: 50
End: 2020-07-24
Attending: INTERNAL MEDICINE
Payer: COMMERCIAL

## 2020-07-27 ENCOUNTER — APPOINTMENT (OUTPATIENT)
Dept: PHYSICAL THERAPY | Age: 50
End: 2020-07-27
Attending: INTERNAL MEDICINE
Payer: COMMERCIAL

## 2020-07-29 ENCOUNTER — HOSPITAL ENCOUNTER (OUTPATIENT)
Dept: PHYSICAL THERAPY | Age: 50
Discharge: HOME | End: 2020-07-29
Attending: INTERNAL MEDICINE
Payer: COMMERCIAL

## 2020-07-29 PROCEDURE — 97110 THERAPEUTIC EXERCISES: CPT

## 2020-07-29 NOTE — PROGRESS NOTES
Harjit Parkinson  : 1970  Payor: Supriya Carr / Plan: Shaina Khanna / Product Type: PPO /  [address]  at [facility]  [address] [facility], [place] [address] [address] [facility]  [phone]   [phone]      OUTPATIENT PHYSICAL THERAPY: Daily Treatment Note 2020  Visit Count:  3      ICD-10: Treatment Diagnosis:   M54.9 Mid Back Pain  M54.5 Low Back Pain   R26.2 Difficulty in Walking, Not Elsewhere Classified    Precautions/Allergies:   Biaxin [clarithromycin] and Topamax [topiramate]     TREATMENT PLAN:  Effective Dates: 7/15/2020 TO 10/13/2020 (90 days). Frequency/Duration: 2 times a week for 90 Days        PRE-TREATMENT SYMPTOMS/COMPLAINTS:  Pt states stiffness is still greatest concern. Worked 4 twelve hour shifts and has had stiffness increase. MEDICATIONS REVIEWED:  2020   TREATMENT:       THERAPEUTIC EXERCISE: (40 minutes):  Exercises per grid below to improve mobility, strength and balance. Required minimal visual and verbal cues to promote proper body alignment and promote proper body posture. Progressed resistance, range and complexity of movement as indicated. Date:  7/15/2020 Date:   Date:     Activity/Exercise Parameters Parameters Parameters   Hooklying TA x15  Hands under glutes x20 with 2 sec hold.  Standing x10 with 5 sec hold   LTR x30/side     SKTC x15/leg     Supine piriformis stretch [id number]/leg     QL stretch  5x20 sec bilat Seated 5x20 sec RT side   Sci-Fit  8 min level 3 10 min level 4 with moist heat   Cross body stretch holding Pole  [id number]/side    Seated thoracic Ext  With 2# bar x20 OH with 5 sec hold    Seated trunk twist   2# bar x10 with 5 sec hold  2# bar x10 with 5 sec hold   Lumbar Flexion over physioball   Green physioball 5x10 sec hold Blue physioball with LT trunk flexion [id number]   TA with March  Standing x10/leg x20 hooklying   Supine Hip Cross Body Stretch   3x40 sec hold   TA with heelslide x15/leg                 MANUAL THERAPY: (15 minutes): Joint mobilization, Soft tissue mobilization and Manipulation was utilized and necessary because of the patient's restricted joint motion, painful spasm, loss of articular motion and restricted motion of soft tissue. +Sidelying Lumbar Roll mob Grade III/IV to increase mobility Bilat  +Bilat LAD Grade III to improve mobility and distract lumbar spine  +Sidelying lumbar manipulation       (Used abbreviations: MET - muscle energy technique; PNF - proprioceptive neuromuscular facilitation; NMR - neuromuscular re-education; AP - anterior to posterior; PA - posterior to anterior)    MODALITIES: (0 minutes):      None Today      TREATMENT/SESSION ASSESSMENT:  Berenice Found with some stiffness in paraspinals greater today than compared to last session. Pt instructed to use heat in conjunction with stretching at home to alleviate potential muscle spasm during work. Continued core exercises and lumbar/thoracic stretching to alleviate pain, improve mechanics and improved mobility. Ambulating with RLE in ER and instructed on how to improve ambulation. Education: All exercises prescribed as HEP    RECOMMENDATIONS/INTENT FOR NEXT TREATMENT SESSION: \"Next visit will focus on advancements to more challenging activities\".     PAIN: Initial: 6/10 Post Session:  3/10     [facility] Portal    Total Treatment Billable Duration: 40  PT Patient Time In/Time Out  Time In: 7770  Time Out: 99 [facility], PT, DPT    Future Appointments   Date Time Provider Dunia Duke   7/31/2020 10:15 AM [facility]   9/3/2020  9:20 AM Montana Mensah MD [address] [facility]

## 2020-07-31 ENCOUNTER — HOSPITAL ENCOUNTER (OUTPATIENT)
Dept: PHYSICAL THERAPY | Age: 50
Discharge: HOME | End: 2020-07-31
Attending: INTERNAL MEDICINE
Payer: COMMERCIAL

## 2020-07-31 PROCEDURE — 97110 THERAPEUTIC EXERCISES: CPT

## 2020-07-31 NOTE — PROGRESS NOTES
Anibal Jiang  : 1970  Payor: Mitchell Padron / Plan: Jarrod Kamara / Product Type: PPO /  [address]  at [facility]  [address] [facility]", [place] [address] [address] [facility]  [phone]   [phone]      OUTPATIENT PHYSICAL THERAPY: Daily Treatment Note 2020  Visit Count:  4      ICD-10: Treatment Diagnosis:   M54.9 Mid Back Pain  M54.5 Low Back Pain   R26.2 Difficulty in Walking, Not Elsewhere Classified    Precautions/Allergies:   Biaxin [clarithromycin] and Topamax [topiramate]     TREATMENT PLAN:  Effective Dates: 7/15/2020 TO 10/13/2020 (90 days). Frequency/Duration: 2 times a week for 90 Days        PRE-TREATMENT SYMPTOMS/COMPLAINTS:  Pt states she is feeling better overall. Having trouble implementing the walking and exercises, but not increasing pain. 2/10 pain with less stiffness today. MEDICATIONS REVIEWED:  2020   TREATMENT:       THERAPEUTIC EXERCISE: (40 minutes):  Exercises per grid below to improve mobility, strength and balance. Required minimal visual and verbal cues to promote proper body alignment and promote proper body posture. Progressed resistance, range and complexity of movement as indicated. Date:  7/15/2020 Date:   Date:   Date        Activity/Exercise Parameters Parameters Parameters       Hooklying TA x15  Hands under glutes x20 with 2 sec hold.  Standing x10 with 5 sec hold       LTR x30/side         SKTC x15/leg         Supine piriformis stretch [id number]/leg         QL stretch  5x20 sec bilat Seated 5x20 sec RT side       Sci-Fit  8 min level 3 10 min level 4 with moist heat 10 min level 4 with moist heat      Cross body stretch holding Pole  [id number]/side        Seated thoracic Ext  With 2# bar x20 OH with 5 sec hold        Seated trunk twist   2# bar x10 with 5 sec hold  2# bar x10 with 5 sec hold       Lumbar Flexion over physioball   Green physioball 5x10 sec hold Blue physioball with LT trunk flexion [id number] Blue physioball with LT trunk flexion [id number]      TA with March  Standing x10/leg x20 hooklying x20       Supine Hip Cross Body Stretch   3x40 sec hold       TA with heelslide   x15/leg x20/leg      TA with SLR    Supine, hands under glutes 3x15/leg. Then 2x15/leg alternating      Palloff Press    7.5# 2x15/side      SLS with UE                                                    MANUAL THERAPY: (0 minutes): Joint mobilization, Soft tissue mobilization and Manipulation was utilized and necessary because of the patient's restricted joint motion, painful spasm, loss of articular motion and restricted motion of soft tissue. +Sidelying Lumbar Roll mob Grade III/IV to increase mobility Bilat  +Bilat LAD Grade III to improve mobility and distract lumbar spine  +Sidelying lumbar manipulation       (Used abbreviations: MET - muscle energy technique; PNF - proprioceptive neuromuscular facilitation; NMR - neuromuscular re-education; AP - anterior to posterior; PA - posterior to anterior)    MODALITIES: (0 minutes):      None Today      TREATMENT/SESSION ASSESSMENT:  Harjit Parkinson needing continued core cueing. Ambulating with increased speed and UE use. Overall demonstrating more robust core contraction, but still struggling coordination. Subjective symptoms improving and patient progressing well. Education: All exercises prescribed as HEP    RECOMMENDATIONS/INTENT FOR NEXT TREATMENT SESSION: \"Next visit will focus on advancements to more challenging activities\".     PAIN: Initial: 2/10 Post Session:  2/10     [facility]    Total Treatment Billable Duration: 40  PT Patient Time In/Time Out  Time In: 1020  Time Out: [address] [place], PT, DPT    Future Appointments   Date Time Provider Dunia Duke   8/4/2020 11:00 AM Peggy Doss [facility]   8/5/2020  8:45 AM Kunal Briscoe [facility]   9/3/2020  9:20 AM Kamryn Addison MD [address] [facility]

## 2020-08-03 ENCOUNTER — APPOINTMENT (OUTPATIENT)
Dept: PHYSICAL THERAPY | Age: 50
End: 2020-08-03
Attending: INTERNAL MEDICINE
Payer: COMMERCIAL

## 2020-08-04 ENCOUNTER — HOSPITAL ENCOUNTER (OUTPATIENT)
Dept: PHYSICAL THERAPY | Age: 50
Discharge: HOME | End: 2020-08-04
Attending: INTERNAL MEDICINE
Payer: COMMERCIAL

## 2020-08-04 PROCEDURE — 97110 THERAPEUTIC EXERCISES: CPT

## 2020-08-04 PROCEDURE — 97140 MANUAL THERAPY 1/> REGIONS: CPT

## 2020-08-04 NOTE — PROGRESS NOTES
h  Corinne Floor  : 1970  Payor: Rafael Mcintyre / Plan: Levy Schuster / Product Type: Commerical /  [address]  at [facility]  [address], [address], [place] [address] [address] [facility]  [phone]   [id number]      OUTPATIENT PHYSICAL THERAPY: Daily Treatment Note 2020  Visit Count:  5      ICD-10: Treatment Diagnosis:   M54.9 Mid Back Pain  M54.5 Low Back Pain   R26.2 Difficulty in Walking, Not Elsewhere Classified    Precautions/Allergies:   Biaxin [clarithromycin] and Topamax [topiramate]     TREATMENT PLAN:  Effective Dates: 7/15/2020 TO 10/13/2020 (90 days). Frequency/Duration: 2 times a week for 90 Days        PRE-TREATMENT SYMPTOMS/COMPLAINTS: Back pain and tightness continues. Pain mostly in low back and hips. Pain level 3-4/10. MEDICATIONS REVIEWED:  2020   TREATMENT:       THERAPEUTIC EXERCISE: (25 minutes):  Exercises per grid below to improve mobility, strength and balance. Required minimal visual and verbal cues to promote proper body alignment and promote proper body posture. Progressed resistance, range and complexity of movement as indicated. Date:  7/15/2020 Date:   Date:   Date   Date  20     Activity/Exercise Parameters Parameters Parameters       Hooklying TA x15  Hands under glutes x20 with 2 sec hold. Standing x10 with 5 sec hold       LTR x30/side    5 second hold x 10 reps B     SKTC x15/leg    Assisted  5 x 20-30 second hold B      Supine piriformis stretch [id number]/leg    3 x 30 second hold left assisted  Patient unable to cross right leg so worked IR and ER of hip with hip/knee flexion to improve mobility     Hamstring stretch     Passive  3 x 30 second hold B     Log roll     Instructed patient in log roll technique to go from sit to supine and supine to sit.  Patient stated she understood and performed correctly     QL stretch  5x20 sec bilat Seated 5x20 sec RT side       Sci-Fit  8 min level 3 10 min level 4 with moist heat 10 min level 4 with moist heat Level 3   10 minutes with moist heat concurrently to back     Cross body stretch holding Pole  [id number]/side        Seated thoracic Ext  With 2# bar x20 OH with 5 sec hold        Seated trunk twist   2# bar x10 with 5 sec hold  2# bar x10 with 5 sec hold       Lumbar Flexion over physioball   Green physioball 5x10 sec hold Blue physioball with LT trunk flexion [id number] Blue physioball with LT trunk flexion [id number]      TA with March  Standing x10/leg x20 hooklying x20       Supine Hip Cross Body Stretch   3x40 sec hold       TA with heelslide   x15/leg x20/leg      TA with SLR    Supine, hands under glutes 3x15/leg. Then 2x15/leg alternating      Palloff Press    7.5# 2x15/side      SLS with UE                                                    MANUAL THERAPY: (25 minutes): Joint mobilization, Soft tissue mobilization and Manipulation was utilized and necessary because of the patient's restricted joint motion, painful spasm, loss of articular motion and restricted motion of soft tissue. Patient prone with pillow under feet for STM with hands and large guasha tool to bilateral low to mid back and upper gluts to help decrease tightness in the entire area. (Used abbreviations: MET - muscle energy technique; PNF - proprioceptive neuromuscular facilitation; NMR - neuromuscular re-education; AP - anterior to posterior; PA - posterior to anterior)    MODALITIES: (10 minutes): Moist heat to low back concurrently with Scifit exercise and after manual ice pack to low back x 10 minutes with patient supine and knee wedge in place. TREATMENT/SESSION ASSESSMENT:  Sonal Corona has severe tightness in back and hip areas with very limited mobility. Educated on importance of gentle stretching exercises and alternating heat and ice at home for pain relief.  Patient given a written handout and patient stated that her  could help her with gentle stretching exercises at home. Patient educated understood log roll technique and the importance to perform this at home when getting up and down from bed. Slight decrease in palpable low back tightness following moist heat and manual. Patient stated that she felt some better following treatment. Education: All exercises prescribed as HEP    RECOMMENDATIONS/INTENT FOR NEXT TREATMENT SESSION: \"Next visit will focus on advancements to more challenging activities\".     PAIN: Initial: 3-4/10 Post Session:  No number given     [employer] Portal    Total Treatment Billable Duration: 50  PT Patient Time In/Time Out  Time In: 1100  Time Out: [address], PTA    Future Appointments   Date Time Provider Dunia Duke   [facility]   9/3/2020  9:20 AM Franklin Greco MD [address] [facility]

## 2020-08-05 ENCOUNTER — HOSPITAL ENCOUNTER (OUTPATIENT)
Dept: PHYSICAL THERAPY | Age: 50
Discharge: HOME | End: 2020-08-05
Attending: INTERNAL MEDICINE
Payer: COMMERCIAL

## 2020-08-12 ENCOUNTER — HOSPITAL ENCOUNTER (OUTPATIENT)
Dept: PHYSICAL THERAPY | Age: 50
Discharge: HOME | End: 2020-08-12
Attending: INTERNAL MEDICINE
Payer: COMMERCIAL

## 2020-08-12 PROCEDURE — 97140 MANUAL THERAPY 1/> REGIONS: CPT

## 2020-08-12 PROCEDURE — 97110 THERAPEUTIC EXERCISES: CPT

## 2020-08-12 NOTE — PROGRESS NOTES
perlita Victoria  : 1970  Payor: Ana Cm / Plan: Floydene Henle / Product Type: Commerical /  [address]  at [facility]  [address], [address], [place] [address] [address] [facility]  [phone]   [phone]      OUTPATIENT PHYSICAL THERAPY: Daily Treatment Note 2020  Visit Count:  6      ICD-10: Treatment Diagnosis:   M54.9 Mid Back Pain  M54.5 Low Back Pain   R26.2 Difficulty in Walking, Not Elsewhere Classified    Precautions/Allergies:   Biaxin [clarithromycin] and Topamax [topiramate]     TREATMENT PLAN:  Effective Dates: 7/15/2020 TO 10/13/2020 (90 days). Frequency/Duration: 2 times a week for 90 Days        PRE-TREATMENT SYMPTOMS/COMPLAINTS: Back was doing a little better last week as she took some time off from therapy. Pain at work initiates after ~1 hr. Tightness and pain. Yesterday 6/10, today 2/10. MEDICATIONS REVIEWED:  2020   TREATMENT:       THERAPEUTIC EXERCISE: (30 minutes):  Exercises per grid below to improve mobility, strength and balance. Required minimal visual and verbal cues to promote proper body alignment and promote proper body posture. Progressed resistance, range and complexity of movement as indicated. Date:  7/15/2020 Date:   Date:   Date   Date  20 Date:      Activity/Exercise Parameters Parameters Parameters       Hooklying TA x15  Hands under glutes x20 with 2 sec hold. Standing x10 with 5 sec hold       LTR x30/side    5 second hold x 10 reps B     SKTC x15/leg    Assisted  5 x 20-30 second hold B      Supine piriformis stretch [id number]/leg    3 x 30 second hold left assisted  Patient unable to cross right leg so worked IR and ER of hip with hip/knee flexion to improve mobility     Hamstring stretch     Passive  3 x 30 second hold B     Log roll     Instructed patient in log roll technique to go from sit to supine and supine to sit.  Patient stated she understood and performed correctly     QL stretch  5x20 sec bilat Seated 5x20 sec RT side       Sci-Fit  8 min level 3 10 min level 4 with moist heat 10 min level 4 with moist heat Level 3   10 minutes with moist heat concurrently to back Level 4 x 8 min     Cross body stretch holding Pole  [id number]/side        Seated thoracic Ext  With 2# bar x20 OH with 5 sec hold        Seated trunk twist   2# bar x10 with 5 sec hold  2# bar x10 with 5 sec hold       Lumbar Flexion over physioball   Green physioball 5x10 sec hold Blue physioball with LT trunk flexion [id number] Blue physioball with LT trunk flexion [id number]      TA with March  Standing x10/leg x20 hooklying x20       Supine Hip Cross Body Stretch   3x40 sec hold       TA with heelslide   x15/leg x20/leg      TA with SLR    Supine, hands under glutes 3x15/leg. Then 2x15/leg alternating      Palloff Press    7.5# 2x15/side      SLS with UE          Chops     RTB 2x15/side     Lifts     RTB 2x15/side     Cat/cow     x20 each position x 5 sec hold     Child's pose     [id number]       MANUAL THERAPY: (10 minutes): Joint mobilization, Soft tissue mobilization and Manipulation was utilized and necessary because of the patient's restricted joint motion, painful spasm, loss of articular motion and restricted motion of soft tissue. Patient prone with pillow under feet for STM with hands and large guasha tool to bilateral low to mid back and upper gluts to help decrease tightness in the entire area. +LAD RLE  +Manual stretch of piriformis, hip ext, hip flexors and hip rotator    (Used abbreviations: MET - muscle energy technique; PNF - proprioceptive neuromuscular facilitation; NMR - neuromuscular re-education; AP - anterior to posterior; PA - posterior to anterior)    MODALITIES: (10 minutes): Moist heat to low back concurrently with Scifit exercise and after manual ice pack to low back x 10 minutes with patient supine and knee wedge in place.        TREATMENT/SESSION ASSESSMENT:  Bala Stewart with mobility limitations that have gradually been improving. Pt has begun complex mobility exercises to begin graded approach to loading low back with light resistance and increased intensity of HEP. Good understanding of current exercises and multiple cues needed throughout entire session as all motions are novel     Education: All exercises prescribed as HEP    RECOMMENDATIONS/INTENT FOR NEXT TREATMENT SESSION: \"Next visit will focus on advancements to more challenging activities\".     PAIN: Initial: 3-4/10 Post Session:  2/10     [employer] Portal    Total Treatment Billable Duration: 40  PT Patient Time In/Time Out  Time In: 0930  Time Out: 1015  Indu Aldridge, PT DPT    Future Appointments   Date Time Provider Dunia Duke   8/14/2020 11:00 AM Manpreet Puga [facility] SFD   8/17/2020 10:15 AM Ulises Partida [facility] SFD   8/19/2020 10:15 AM Ulises Partida [facility] SFD   9/3/2020  9:20 AM Debbie Gee MD [address] [facility]

## 2020-08-14 ENCOUNTER — HOSPITAL ENCOUNTER (OUTPATIENT)
Dept: PHYSICAL THERAPY | Age: 50
Discharge: HOME | End: 2020-08-14
Attending: INTERNAL MEDICINE
Payer: COMMERCIAL

## 2020-08-14 PROCEDURE — 97140 MANUAL THERAPY 1/> REGIONS: CPT

## 2020-08-14 PROCEDURE — 97110 THERAPEUTIC EXERCISES: CPT

## 2020-08-14 NOTE — PROGRESS NOTES
perlita Guillory Tyler  : 1970  Payor: Janusz George / Plan: Heide Nelson / Product Type: Commerical /  [address]  [facility] [address] [facility], [place] [address] [address] [facility]  [phone]   [phone]      OUTPATIENT PHYSICAL THERAPY: Daily Treatment Note 2020  Visit Count:  7      ICD-10: Treatment Diagnosis:   M54.9 Mid Back Pain  M54.5 Low Back Pain   R26.2 Difficulty in Walking, Not Elsewhere Classified    Precautions/Allergies:   Biaxin [clarithromycin] and Topamax [topiramate]     TREATMENT PLAN:  Effective Dates: 7/15/2020 TO 10/13/2020 (90 days). Frequency/Duration: 2 times a week for 90 Days        PRE-TREATMENT SYMPTOMS/COMPLAINTS: Patient reports improvement with treatments and exercises at home.  assisting with stretches at home. Pain level 3/10 today. MEDICATIONS REVIEWED:  2020   TREATMENT:       THERAPEUTIC EXERCISE: (30 minutes):  Exercises per grid below to improve mobility, strength and balance. Required minimal visual and verbal cues to promote proper body alignment and promote proper body posture. Progressed resistance, range and complexity of movement as indicated. Date:  7/15/2020 Date:   Date:   Date   Date  20 Date:   Date  20   Activity/Exercise Parameters Parameters Parameters       Hooklying TA x15  Hands under glutes x20 with 2 sec hold.  Standing x10 with 5 sec hold       LTR x30/side    5 second hold x 10 reps B  5 second hold x 10 reps B   SKTC x15/leg    Assisted  5 x 20-30 second hold B   With towel   20 second hold x 3 reps B Active   Supine piriformis stretch [id number]/leg    3 x 30 second hold left assisted  Patient unable to cross right leg so worked IR and ER of hip with hip/knee flexion to improve mobility  3 x 30 second hold B Active  Patient able to complete stretch B  Showing increased mobility   Hamstring stretch     Passive  3 x 30 second hold B  3 x 30 second hold with strap   active   Log roll     Instructed patient in log roll technique to go from sit to supine and supine to sit. Patient stated she understood and performed correctly     QL stretch  5x20 sec bilat Seated 5x20 sec RT side       Sci-Fit  8 min level 3 10 min level 4 with moist heat 10 min level 4 with moist heat Level 3   10 minutes with moist heat concurrently to back Level 4 x 8 min  Level 3  NuStep  9 minutes     Cross body stretch holding Pole  [id number]/side        Seated thoracic Ext  With 2# bar x20 OH with 5 sec hold        Seated trunk twist   2# bar x10 with 5 sec hold  2# bar x10 with 5 sec hold       Lumbar Flexion over physioball   Green physioball 5x10 sec hold Blue physioball with LT trunk flexion [id number] Blue physioball with LT trunk flexion [id number]      TA with March  Standing x10/leg x20 hooklying x20       Supine Hip Cross Body Stretch   3x40 sec hold       TA with heelslide   x15/leg x20/leg      TA with SLR    Supine, hands under glutes 3x15/leg. Then 2x15/leg alternating      Palloff Press    7.5# 2x15/side      SLS with UE          Chops     RTB 2x15/side  At home   Lifts     RTB 2x15/side  At home   Cat/cow     x20 each position x 5 sec hold  X 10 reps with 5 second hold each   Child's pose     [id number]  3 x 30 second hold     MANUAL THERAPY: (15 minutes): Patient prone for STM with hands, Guasha tool, and weighted roller bar to low back right more than left all to help decrease tightness and pain and help improve mobility. (Used abbreviations: MET - muscle energy technique; PNF - proprioceptive neuromuscular facilitation; NMR - neuromuscular re-education; AP - anterior to posterior; PA - posterior to anterior)    MODALITIES: (0). TREATMENT/SESSION ASSESSMENT:  Shemar Salcedo reports improved function and more ease with HEP. Patient exhibits improved ability to perform exercises independently and improved mobility. Pain and tightness decreasing.  Good progress with treatment and exercises. Education: All exercises prescribed as HEP    RECOMMENDATIONS/INTENT FOR NEXT TREATMENT SESSION: \"Next visit will focus on advancements to more challenging activities\".     PAIN: Initial: 3/10 Post Session:  2/10     [employer] Portal    Total Treatment Billable Duration: 45  PT Patient Time In/Time Out  Time In: 1100  Time Out: 1 Cady Childress PTA    Future Appointments   Date Time Provider Dunia Duke   8/17/2020 10:15 AM Fabio Thompson [facility] SFD   8/19/2020 10:15 AM Fabio Thompson [facility] SFD   9/3/2020  9:20 AM Hemanth Morataya MD [address] [place] [address]

## 2020-08-17 ENCOUNTER — HOSPITAL ENCOUNTER (OUTPATIENT)
Dept: PHYSICAL THERAPY | Age: 50
Discharge: HOME | End: 2020-08-17
Attending: INTERNAL MEDICINE
Payer: COMMERCIAL

## 2020-08-17 PROCEDURE — 97110 THERAPEUTIC EXERCISES: CPT

## 2020-08-17 PROCEDURE — 97140 MANUAL THERAPY 1/> REGIONS: CPT

## 2020-08-17 NOTE — PROGRESS NOTES
perlita Parmar Smalls  : 1970  Payor: Kevon Moeller / Plan: Stivenperlita Augustin / Product Type: Commerical /  [address]  at [facility]  [address] [facility], [address] [place] [address], [address]  [phone]   [phone]      OUTPATIENT PHYSICAL THERAPY: Daily Treatment Note 2020  Visit Count:  8      ICD-10: Treatment Diagnosis:   M54.9 Mid Back Pain  M54.5 Low Back Pain   R26.2 Difficulty in Walking, Not Elsewhere Classified    Precautions/Allergies:   Biaxin [clarithromycin] and Topamax [topiramate]     TREATMENT PLAN:  Effective Dates: 7/15/2020 TO 10/13/2020 (90 days). Frequency/Duration: 2 times a week for 90 Days        PRE-TREATMENT SYMPTOMS/COMPLAINTS: Patient reports she just finished two 12 hour shifts this weekend. Pain level 3/10 today. She reports her  still assisting her with the stretches. Her biggest complaint is when she work. She has no place to sit and rest when she needs to. She reports she feels as if she has to push through til she gets home. MEDICATIONS REVIEWED:  2020   TREATMENT:       THERAPEUTIC EXERCISE: (30 minutes):  Exercises per grid below to improve mobility, strength and balance. Required minimal visual and verbal cues to promote proper body alignment and promote proper body posture. Progressed resistance, range and complexity of movement as indicated. Date:   Date:   Date   Date  20 Date:   Date  20 Date:  2020   Activity/Exercise Parameters Parameters        Hooklying TA  Hands under glutes x20 with 2 sec hold.  Standing x10 with 5 sec hold        LTR    5 second hold x 10 reps B  5 second hold x 10 reps B 10 x 5 sec hold B   SKTC    Assisted  5 x 20-30 second hold B   With towel   20 second hold x 3 reps B Active 3 x 30 sec hold B   Supine piriformis stretch    3 x 30 second hold left assisted  Patient unable to cross right leg so worked IR and ER of hip with hip/knee flexion to improve mobility  3 x 30 second hold B Active  Patient able to complete stretch B  Showing increased mobility 3 x 30 sec hold B       Showed a sitting stretch to be able to do at work. Hamstring stretch    Passive  3 x 30 second hold B  3 x 30 second hold with strap   active 3 x 30 sec hold B with strap     Nerve glide to count of 10 on each side    Log roll    Instructed patient in log roll technique to go from sit to supine and supine to sit. Patient stated she understood and performed correctly      QL stretch 5x20 sec bilat Seated 5x20 sec RT side        Sci-Fit 8 min level 3 10 min level 4 with moist heat 10 min level 4 with moist heat Level 3   10 minutes with moist heat concurrently to back Level 4 x 8 min  Level 3  NuStep  9 minutes   Level 3  Nustep   10 minutes    Cross body stretch holding Pole [id number]/side         Seated thoracic Ext With 2# bar x20 OH with 5 sec hold         Seated trunk twist  2# bar x10 with 5 sec hold  2# bar x10 with 5 sec hold        Lumbar Flexion over physioball  Green physioball 5x10 sec hold Blue physioball with LT trunk flexion [id number] Blue physioball with LT trunk flexion [id number]       TA with March Standing x10/leg x20 hooklying x20        Supine Hip Cross Body Stretch  3x40 sec hold        TA with heelslide  x15/leg x20/leg       TA with SLR   Supine, hands under glutes 3x15/leg. Then 2x15/leg alternating       Palloff Press   7.5# 2x15/side       SLS with UE          Chops    RTB 2x15/side  At home    Lifts    RTB 2x15/side  At home    Cat/cow    x20 each position x 5 sec hold  X 10 reps with 5 second hold each    Child's pose    [id number]  3 x 30 second hold      MANUAL THERAPY: (15 minutes): Patient prone for STM with hands and thera-roller to low back right more than left all to help decrease tightness and pain and help improve mobility.      (Used abbreviations: MET - muscle energy technique; PNF - proprioceptive neuromuscular facilitation; NMR - neuromuscular re-education; AP - anterior to posterior; PA - posterior to anterior)    MODALITIES: (10 minutes ):  Patient prone with pillows under ankles and stomach for moist heat to low back to decrease tightness, decrease pain, and increase mobility. Skin was clear and intact. TREATMENT/SESSION ASSESSMENT:  Berenice Found reports improvement with pain and will try heat at home prior to stretches and at work. Patient exhibits improved ability to perform exercises independently and improved mobility. Pain and tightness decreasing. Good progress with treatment and exercises. Education: Certain exercises in sitting for patient to perform at work while on her break. Use of heat or cold while at work. RECOMMENDATIONS/INTENT FOR NEXT TREATMENT SESSION: \"Next visit will focus on advancements to more challenging activities\".     PAIN: Initial: 3/10 Post Session:  2/10     [employer] Portal    Total Treatment Billable Duration: 45  PT Patient Time In/Time Out  Time In: 1010  Time Out: Aakash Mendoza PTA    Future Appointments   Date Time Provider Dunia Duke   8/19/2020 10:15 AM Adis Willis PTA [facility] SFD   9/3/2020  9:20 AM Montana Mensah MD [address] [facility]

## 2020-08-19 ENCOUNTER — HOSPITAL ENCOUNTER (OUTPATIENT)
Dept: PHYSICAL THERAPY | Age: 50
Discharge: HOME | End: 2020-08-19
Attending: INTERNAL MEDICINE
Payer: COMMERCIAL

## 2020-08-19 NOTE — PROGRESS NOTES
Agata Smalls  : 1970  Primary: Timmothy Sickle Rpn  Secondary: Ashia at [facility]  [address], [address] [place] [address], [address]  [phone]   [phone]      OUTPATIENT DAILY NOTE    NAME/AGE/GENDER: Agata Smalls is a 44 y.o. female. DATE: 2020    Ms. Zamora Began for today's appointment due to being stuck at [facility] doctor's office still and would not make appointment time. This is her first cancel/no show. Will follow up with her at the next visit.      Eddie Lyon PTA

## 2020-09-18 NOTE — THERAPY DISCHARGE
[facility] : 1970 Payor: Alton Duron / Plan: Jasmina Whatley / Product Type: Commerical /  [address] [place]  at [facility], [facility], [address] [phone]   [phone] OUTPATIENT PHYSICAL THERAPY:Discontinuation Summary 2020 ICD-10: Treatment Diagnosis:  
M54.9 Mid Back Pain M54.5 Low Back Pain R26.2 Difficulty in Walking, Not Elsewhere Classified Precautions/Allergies:  
Biaxin [clarithromycin] and Topamax [topiramate] Fall Risk Score: 0 (? 5 = High Risk) MD Orders: Referral to Physical Therapy MEDICAL/REFERRING DIAGNOSIS: 
Mid back pain [M54.9] DATE OF ONSET: ~5 months ago REFERRING PHYSICIAN: Moise TOVAR PHYSICIAN APPOINTMENT: Tbd by Patient ASSESSMENT:  Ms. Lori Reyna has attended 7 physical therapy session including the initial evaluation. Pt failed to reschedule appointments following cancellation 1 month ago. Pt d/c from PT services at this time due to failing to return for follow up sessions. Goals unable to be reassessed. PROBLEM LIST (Impacting functional limitations): 1. Decreased Strength 2. Decreased ADL/Functional Activities 3. Decreased Transfer Abilities 4. Decreased Ambulation Ability/Technique 5. Decreased Balance 6. Increased Pain 7. Decreased Flexibility/Joint Mobility 8. Decreased [place] with Home Exercise Program INTERVENTIONS PLANNED: 
1. Balance Exercise 2. Bed Mobility 3. Cold 4. Cryotherapy 5. Family Education 6. Gait Training 7. Heat 8. Home Exercise Program (HEP) 9. Manual Therapy 10. Neuromuscular Re-education/Strengthening 11. Range of Motion (ROM) 12. Therapeutic Activites 13. Therapeutic Exercise/Strengthening 14. Transcutaneous Electrical Nerve Stimulation (TENS) 15. Transfer Training TREATMENT PLAN: 
TREATMENT PLAN: 
 
Short-Term Functional Goals: Time Frame: 3 weeks 1. Pt will be compliant with HEP. 2. Pt will decreased worst pain to 4/10 or less in order to improve ambulation 3. Pt will navigate 12 steps reciprocally with single UE use in order to enter home 4. Pt will decreased [place] to 15 or less in order to return to recreational activities Discharge Goals: Time Frame: 6 weeks 1. Pt will be independent with HEP. 2. Pt will decreased worst pain to 2/10 or less in order to improve ambulation 3. Pt will navigate 12 steps reciprocally without UE use in order to enter home 4. Pt will decreased [place] to 10 or less in order to return to recreational activities 5. Pt will improve BLE strength to 5/5 or greater in order to improve stair navigation 6. Pt will ambulate without antalgia Regarding Ingrid Justice's therapy, I certify that the treatment plan above will be carried out by a therapist or under their direction. Thank you for this referral, 
Michael Campbell, PT, DPT The information in this section was collected on 07/15/20 (except where otherwise noted). HISTORY:  
History of Present Injury/Illness (Reason for Referral): 
Pt has been dealing with mid back and low back pain for ~ 5 months. Diagnosed with RA in December. Medications for RA did not help back pain. 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 Pain has been constant. Pt has been attempting to return to walking program, but back catches and hurts. CC/Primary Concern: 
      Mid and low back pain. Treatment Side: Bilateral (LT>RT) Hospitalization and time spent in care: None Past Medical History/Comorbidities: Ms. Kim Sykes  has a past medical history of Arthralgia (11/19/2013), Carpal tunnel syndrome (11/19/2013), Functional constipation (11/19/2013), HTN (hypertension) (11/19/2013), Hypercholesterolemia, Migraine headache (11/19/2013), Onychomycosis (11/19/2013), Tachycardia (11/19/2013), and UTI (lower urinary tract infection) (11/19/2013).   Ms. Kim Sykes  has a past surgical history that includes hx breast lumpectomy; ir ablation fibroadenoma; hx carpal tunnel release (Right, 09/2019); and hx breast biopsy. Social History/Living Environment:  
  Work as nurse, lives with , 12 steps to enter home [employer] has trouble and uses rail) Pain/Symptom Location: Lower throacic and low back Worst Pain: 10/10 Current Pain: 4/10 Aggravating Factors: Standing, Walking, Stairs Up, Stairs Down and Sit to stand Alleviating Factors/Positions/Motions: Pain medications General Health: Good Diagnostic Imaging: No imaging Occupation: full time job doing nurse Unexplained Weight Loss: No 
 
 
Prior Level of Function/Work/Activity: No pain and fully functioning with ADLs, no compensations Patient Goals: Walking 1 mile, return to resistance training Current Medications:   
  
Current Outpatient Medications:  
  hydrOXYchloroQUINE (PLAQUENIL) 200 mg tablet, Take 1 Tab by mouth two (2) times daily (after meals). , Disp: 180 Tab, Rfl: 0 
  amitriptyline (ELAVIL) 100 mg tablet, Take 1 Tab by mouth nightly., Disp: 90 Tab, Rfl: 3 
  ARIPiprazole (ABILIFY) 10 mg tablet, Take 1 Tab by mouth daily. , Disp: 90 Tab, Rfl: 1 
  atorvastatin (LIPITOR) 10 mg tablet, Take 1 Tab by mouth nightly., Disp: 90 Tab, Rfl: 2 
  dilTIAZem ER (Cardizem CD) 360 mg capsule, Take 1 Cap by mouth daily. , Disp: 90 Cap, Rfl: 3 
  flash glucose sensor (FreeStyle Theresa 14 Day Sensor) kit, 1 unit every 14 days, Disp: 4 Kit, Rfl: 2 
  losartan-hydroCHLOROthiazide (Hyzaar) 100-25 mg per tablet, Take 1 Tab by mouth daily. , Disp: 90 Tab, Rfl: 3 
  metoprolol tartrate (LOPRESSOR) 100 mg IR tablet, Take 1 Tab by mouth two (2) times a day., Disp: 180 Tab, Rfl: 3 
  omeprazole (PRILOSEC) 40 mg capsule, Take 1 Cap by mouth daily. , Disp: 90 Cap, Rfl: 3 
  ondansetron hcl (Zofran) 8 mg tablet, Take 1 Tab by mouth every eight (8) hours as needed for Nausea., Disp: 60 Tab, Rfl: 5   pregabalin (LYRICA) 150 mg capsule, Take 1 Cap by mouth two (2) times a day., Disp: 60 Cap, Rfl: 5 
  promethazine (PHENERGAN) 25 mg tablet, Take 1 Tab by mouth every six (6) hours as needed for Nausea., Disp: 30 Tab, Rfl: 2   Victoza 3-Keyon 0.6 mg/0.1 mL (18 mg/3 mL) pnij, 1.8 mg by SubCUTAneous route daily. , Disp: 4 Adjustable Dose Pre-filled Pen Syringe, Rfl: 3 
  insulin degludec [place]ake FlexTouch U-100) 100 unit/mL (3 mL) inpn, Inject 60 units sq qhs, Disp: 5 Pen, Rfl: 3 
  DULoxetine (CYMBALTA) 60 mg capsule, Take 1 Cap by mouth two (2) times a day., Disp: 60 Cap, Rfl: 1 
  flash glucose scanning reader (Re5ultSTYLE BERRY 14 DAY READER) misc, 1 Units by Does Not Apply route every fourteen (14) days. , Disp: 1 Each, Rfl: 1 
  docusate sodium (COLACE) 100 mg capsule, Take 100 mg by mouth two (2) times a day., Disp: , Rfl:  
  Cetirizine (ZYRTEC) 10 mg cap, Take 10 mg by mouth daily. , Disp: , Rfl:  
  aspirin delayed-release 81 mg tablet, Take 81 mg by mouth daily. , Disp: , Rfl:   
Date Last Reviewed:  8/17/2020 Ambulatory/Rehab Services H2 Model Falls Risk Assessment Risk Factors: 
     No Risk Factors Identified Ability to Rise from Chair: 
     (0)  Ability to rise in a single movement Falls Prevention Plan: No modifications necessary Total: (5 or greater = High Risk): 0  
 ©2010 [facility] [address] States Patent #[id number]. Federal Law prohibits the replication, distribution or use without written permission from [facility] of [address] [place] [address] Number of Personal Factors/Comorbidities that affect the Plan of Care: 1-2: MODERATE COMPLEXITY EXAMINATION:  
Inspection Patient Consent: Yes Iliac Crest: Right Elevated PSIS: Right Elevated ASIS: Right Elevated Additional Comments:  
________________________________________________________________________________________________ Observation Transfers: Sit to stand: Increased time, use of BUE and Supine to sit: Increased time Posture: Increased lumbar lordosis, BLE in ER (RT>LT) Gait: Antalgic, Lacks Full Knee Ext at [place] & Minor, Decreased Gait Speed, Decreased Stride Length and Decreased Step Length Assistive Device: 
      Type: None Hand Use: N/A Stairs: Step to Pattern Railing: right Muscle Guarding: Lumbar paraspinals Muscle Atrophy: None appreciated Edema: No Edema, Erythema or Ecchymosis noted Pitting: No 
 
    Movement Apprehension: Lumbar flexion, extension and rotation 
 
________________________________________________________________________________________________ Range of Motion Lumbar Joint:    
 Right (Degrees/Percent) Left (Degrees/Percent) Flexion Hands to reach shins Extension 10deg Rotation 40% 50% Lower Joint: Passive Passive Right (Degrees) Left (Degrees) Hip Flexion 90 90 Hip Internal Rotation 12  14 Hip External Rotation [facility]  
 
 
________________________________________________________________________________________________ Strength Lower Extremity Joint:    
 RIGHT LEFT Hip Flexion 5/5 5/5 Hip Extension 4/5 4/5 Hip Internal Rotation 4+/5 4/5 Hip External Rotation 5/5 5/5 Hip Abduction 4/5 4/5  
 
________________________________________________________________________________________________ Neruo-Vascular C/O Radicular Symptoms: No 
 
  
    LE Neurologic Screen LE NEUROLOGICAL SCREEN: All dermatomes, myotomes, and reflexes WNL Additional Comments:  
________________________________________________________________________________________________ Special Tests Lumbar: 
      Slump: neg, SLR: neg, Passive Vertebral Mobility: Hypomobile grossly in lumbar spine, Branden: neg and Centralization/Peripheralization: neg     Hip: 
 Long Axis Distraction: Improves symtptoms 
       
________________________________________________________________________________________________ Palpation: Tenderness in bilat glutes and bilat piriformis. Increased tone in lumbar paraspinals Body Structures Involved: 1. Joints 2. Muscles Body Functions Affected: 1. Movement Related Activities and Participation Affected: 1. Mobility 2. Self Care 3. Domestic Life Number of elements (examined above) that affect the Plan of Care: 4+: HIGH COMPLEXITY CLINICAL PRESENTATION:  
Presentation: Stable and uncomplicated: LOW COMPLEXITY CLINICAL DECISION MAKING:  
Outcome Measure: Tool Used: Modified Oswestry Low Back Pain Questionnaire Score:  Initial: 26/50  Most Recent: X/50 (Date: -- ) Interpretation of Score: Each section is scored on a 0-5 scale, 5 representing the greatest disability. The scores of each section are added together for a total score of 50. Use of outcome tool(s) and clinical judgement create a POC that gives a: Clear prediction of patient's progress: LOW COMPLEXITY

## (undated) DEVICE — TOPAZ EZ MICRODEBRIDER COBLATION WAND WITH INTEGRATED FINGER SWITCH IFS: Brand: COBLATION

## (undated) DEVICE — BLADE OPHTH DIA4MM MINI MENIS FLAT ARTHRO LOK

## (undated) DEVICE — DRAPE,HAND,STERILE: Brand: MEDLINE

## (undated) DEVICE — STERILE HOOK LOCK LATEX FREE ELASTIC BANDAGE 2INX5YD: Brand: HOOK LOCK™

## (undated) DEVICE — SYRINGE EAR 2OZ ULC SLIMMER TIP FLAT BTM SUCT PWR DISP FOR

## (undated) DEVICE — BIPOLAR FORCEPS CORD: Brand: VALLEYLAB

## (undated) DEVICE — SUTURE MCRYL SZ 5-0 L18IN ABSRB UD L13MM P-3 3/8 CIR PRIM Y493G

## (undated) DEVICE — BNDG,ELSTC,MATRIX,STRL,4"X5YD,LF,HOOK&LP: Brand: MEDLINE

## (undated) DEVICE — ZIMMER® STERILE DISPOSABLE TOURNIQUET CUFF WITH PLC, DUAL PORT, SINGLE BLADDER, 18 IN. (46 CM)

## (undated) DEVICE — PADDING CAST COHESIVE 4 YDX3 IN HND TEARABLE COTTON SPEC 100

## (undated) DEVICE — AMD ANTIMICROBIAL GAUZE SPONGES,12 PLY USP TYPE VII, 0.2% POLYHEXAMETHYLENE BIGUANIDE HCI (PHMB): Brand: CURITY

## (undated) DEVICE — SURGICAL PROCEDURE PACK BASIC ST FRANCIS

## (undated) DEVICE — OCCLUSIVE GAUZE STRIP,3% BISMUTH TRIBROMOPHENATE IN PETROLATUM BLEND: Brand: XEROFORM

## (undated) DEVICE — APPLICATOR BNDG 1MM ADH PREMIERPRO EXOFIN

## (undated) DEVICE — SUTURE MCRYL SZ 3-0 L27IN ABSRB UD L24MM PS-1 3/8 CIR PRIM Y936H

## (undated) DEVICE — DISPOSABLE KIT FOR 1.8 MM Q-FIX                                    IMPLANT, INCLUDES DRILL, DRILL GUIDE                                    AND OBTURATOR: Brand: Q-FIX

## (undated) DEVICE — DRAPE, FILM SHEET, 44X65 STERILE: Brand: MEDLINE

## (undated) DEVICE — BNDG,ELSTC,MATRIX,STRL,3"X5YD,LF,HOOK&LP: Brand: MEDLINE

## (undated) DEVICE — SUTURE VCRL SZ 3-0 L27IN ABSRB UD L26MM SH 1/2 CIR J416H

## (undated) DEVICE — Device

## (undated) DEVICE — INTENT TO BE USED WITH SUTURE MATERIAL FOR TISSUE CLOSURE: Brand: RICHARD-ALLAN®  NEEDLE 1/2 CIRCLE REVERSE CUTTING

## (undated) DEVICE — (D)PREP SKN CHLRAPRP APPL 26ML -- CONVERT TO ITEM 371833

## (undated) DEVICE — BANDAGE,ELASTIC,ESMARK,STERILE,4"X9',LF: Brand: MEDLINE

## (undated) DEVICE — SOLUTION IV 1000ML 0.9% SOD CHL